# Patient Record
Sex: FEMALE | Race: WHITE | Employment: UNEMPLOYED | URBAN - METROPOLITAN AREA
[De-identification: names, ages, dates, MRNs, and addresses within clinical notes are randomized per-mention and may not be internally consistent; named-entity substitution may affect disease eponyms.]

---

## 2021-08-10 ENCOUNTER — OFFICE VISIT (OUTPATIENT)
Dept: NEUROLOGY | Age: 21
End: 2021-08-10
Payer: COMMERCIAL

## 2021-08-10 VITALS — BODY MASS INDEX: 23.39 KG/M2 | HEART RATE: 72 BPM | OXYGEN SATURATION: 99 % | WEIGHT: 132 LBS | HEIGHT: 63 IN

## 2021-08-10 DIAGNOSIS — G44.009 MIGRAINE-CLUSTER HEADACHE SYNDROME: ICD-10-CM

## 2021-08-10 DIAGNOSIS — E55.9 VITAMIN D DEFICIENCY: ICD-10-CM

## 2021-08-10 DIAGNOSIS — H81.4 VERTIGO, CENTRAL: Primary | ICD-10-CM

## 2021-08-10 DIAGNOSIS — R41.3 MEMORY LOSS: ICD-10-CM

## 2021-08-10 DIAGNOSIS — R53.1 WEAKNESS GENERALIZED: ICD-10-CM

## 2021-08-10 PROCEDURE — 99243 OFF/OP CNSLTJ NEW/EST LOW 30: CPT | Performed by: PSYCHIATRY & NEUROLOGY

## 2021-08-10 RX ORDER — ERGOCALCIFEROL 1.25 MG/1
CAPSULE ORAL
COMMUNITY
Start: 2021-01-14 | End: 2021-08-10

## 2021-08-10 RX ORDER — NORETHINDRONE ACETATE AND ETHINYL ESTRADIOL 1MG-20(21)
KIT ORAL
COMMUNITY
End: 2021-08-10

## 2021-08-10 RX ORDER — ESCITALOPRAM OXALATE 20 MG/1
TABLET ORAL
COMMUNITY
Start: 2021-08-08

## 2021-08-10 RX ORDER — LORAZEPAM 0.5 MG/1
TABLET ORAL
COMMUNITY

## 2021-08-10 RX ORDER — FLUOXETINE HYDROCHLORIDE 20 MG/1
CAPSULE ORAL
COMMUNITY
End: 2021-08-10

## 2021-08-10 RX ORDER — BUSPIRONE HYDROCHLORIDE 30 MG/1
TABLET ORAL
COMMUNITY
End: 2021-08-10
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
## 2021-08-11 LAB
25(OH)D3+25(OH)D2 SERPL-MCNC: 21.3 NG/ML (ref 30–100)
FOLATE SERPL-MCNC: 10.5 NG/ML
VIT B12 SERPL-MCNC: 361 PG/ML (ref 232–1245)

## 2021-08-18 ENCOUNTER — HOSPITAL ENCOUNTER (OUTPATIENT)
Dept: MRI IMAGING | Age: 21
Discharge: HOME | End: 2021-08-18
Attending: PSYCHIATRY & NEUROLOGY
Payer: COMMERCIAL

## 2021-08-18 DIAGNOSIS — R41.3 MEMORY LOSS: ICD-10-CM

## 2021-08-18 DIAGNOSIS — R53.1 WEAKNESS GENERALIZED: ICD-10-CM

## 2021-08-18 DIAGNOSIS — H81.4 VERTIGO, CENTRAL: ICD-10-CM

## 2021-08-18 PROCEDURE — 74011636320 HC RX REV CODE- 636/320

## 2021-08-18 PROCEDURE — A9576 INJ PROHANCE MULTIPACK: HCPCS

## 2021-08-18 PROCEDURE — 70553 MRI BRAIN STEM W/O & W/DYE: CPT

## 2021-08-18 RX ADMIN — GADOTERIDOL 12 ML: 279.3 INJECTION, SOLUTION INTRAVENOUS at 10:49

## 2023-05-21 RX ORDER — LORAZEPAM 0.5 MG/1
TABLET ORAL
COMMUNITY

## 2023-05-21 RX ORDER — ESCITALOPRAM OXALATE 20 MG/1
TABLET ORAL
COMMUNITY
Start: 2021-08-08